# Patient Record
Sex: MALE | Race: WHITE | URBAN - METROPOLITAN AREA
[De-identification: names, ages, dates, MRNs, and addresses within clinical notes are randomized per-mention and may not be internally consistent; named-entity substitution may affect disease eponyms.]

---

## 2018-02-08 ENCOUNTER — OFFICE VISIT (OUTPATIENT)
Dept: SLEEP MEDICINE | Facility: CLINIC | Age: 48
End: 2018-02-08
Payer: COMMERCIAL

## 2018-02-08 ENCOUNTER — TELEPHONE (OUTPATIENT)
Dept: SLEEP MEDICINE | Facility: CLINIC | Age: 48
End: 2018-02-08

## 2018-02-08 VITALS
DIASTOLIC BLOOD PRESSURE: 83 MMHG | OXYGEN SATURATION: 97 % | WEIGHT: 239 LBS | SYSTOLIC BLOOD PRESSURE: 149 MMHG | HEART RATE: 74 BPM | BODY MASS INDEX: 33.46 KG/M2 | HEIGHT: 71 IN

## 2018-02-08 DIAGNOSIS — G47.33 OSA (OBSTRUCTIVE SLEEP APNEA): Primary | ICD-10-CM

## 2018-02-08 PROCEDURE — 99244 OFF/OP CNSLTJ NEW/EST MOD 40: CPT | Performed by: FAMILY MEDICINE

## 2018-02-08 NOTE — TELEPHONE ENCOUNTER
Patient called and wants to get his C-Pap from [email or web address]  And he will bill it himself. He needs a copy of the prescription, Dr. Long stating he does not need to be retested for his apnea. He only needs a replacement machine. Faxed to  [employer]  preauthorization to [phone]

## 2018-02-08 NOTE — MR AVS SNAPSHOT
After Visit Summary   2/8/2018    Hal Dumont    MRN: [id number]           Patient Information     Date Of Birth          1970        Visit Information        Provider Department      2/8/2018 8:30 AM Jas Merlos MD [facility]        Today's Diagnoses     SAAD (obstructive sleep apnea)    -  1      Care Instructions      Your BMI is Body mass index is 33.81 kg/(m^2).  Weight management is a personal decision.  If you are interested in exploring weight loss strategies, the following discussion covers the approaches that may be successful. Body mass index (BMI) is one way to tell whether you are at a healthy weight, overweight, or obese. It measures your weight in relation to your height.  A BMI of 18.5 to 24.9 is in the healthy range. A person with a BMI of 25 to 29.9 is considered overweight, and someone with a BMI of 30 or greater is considered obese. More than two-thirds of American adults are considered overweight or obese.  Being overweight or obese increases the risk for further weight gain. Excess weight may lead to heart disease and diabetes.  Creating and following plans for healthy eating and physical activity may help you improve your health.  Weight control is part of healthy lifestyle and includes exercise, emotional health, and healthy eating habits. Careful eating habits lifelong are the mainstay of weight control. Though there are significant health benefits from weight loss, long-term weight loss with diet alone may be very difficult to achieve- studies show long-term success with dietary management in less than 10% of people. Attaining a healthy weight may be especially difficult to achieve in those with severe obesity. In some cases, medications, devices and surgical management might be considered.  What can you do?  If you are overweight or obese and are interested in methods for weight loss, you should discuss this with your provider.      Consider reducing daily calorie intake by 500 calories.     Keep a food journal.     Avoiding skipping meals, consider cutting portions instead.    Diet combined with exercise helps maintain muscle while optimizing fat loss. Strength training is particularly important for building and maintaining muscle mass. Exercise helps reduce stress, increase energy, and improves fitness. Increasing exercise without diet control, however, may not burn enough calories to loose weight.       Start walking three days a week 10-20 minutes at a time    Work towards walking thirty minutes five days a week     Eventually, increase the speed of your walking for 1-2 minutes at time    In addition, we recommend that you review healthy lifestyles and methods for weight loss available through the National Institutes of Health patient information sites:  http://win.niddk.nih.gov/publications/index.htm    And look into health and wellness programs that may be available through your health insurance provider, employer, local community center, or abiodun club.    Weight management plan: Patient was referred to their PCP to discuss a diet and exercise plan.            Follow-ups after your visit        Follow-up notes from your care team     Return in about 4 weeks (around 3/8/2018), or if symptoms worsen or fail to improve.      Who to contact     If you have questions or need follow up information about today's clinic visit or your schedule please contact [facility] directly at [phone].  Normal or non-critical lab and imaging results will be communicated to you by MyChart, letter or phone within 4 business days after the clinic has received the results. If you do not hear from us within 7 days, please contact the clinic through MyChart or phone. If you have a critical or abnormal lab result, we will notify you by phone as soon as possible.  Submit refill requests through [employer] or call your pharmacy and they will  "forward the refill request to us. Please allow 3 business days for your refill to be completed.          Additional Information About Your Visit        [employer] Information     [employer] gives you secure access to your electronic health record. If you see a primary care provider, you can also send messages to your care team and make appointments. If you have questions, please call your primary care clinic.  If you do not have a primary care provider, please call [phone] and they will assist you.        Care EveryWhere ID     This is your Care EveryWhere ID. This could be used by other organizations to access your [facility] medical records  [id number]        Your Vitals Were     Pulse Height Pulse Oximetry BMI (Body Mass Index)          74 1.791 m (5' 10.5\") 97% 33.81 kg/m2         Blood Pressure from Last 3 Encounters:   02/08/18 149/83   09/25/13 (!) 152/98   07/21/12 129/88    Weight from Last 3 Encounters:   02/08/18 108.4 kg (239 lb)   09/25/13 112.3 kg (247 lb 9.6 oz)   07/21/12 108.4 kg (239 lb)              We Performed the Following     Comprehensive DME        Primary Care Provider Office Phone # Fax #    Jose Lyn Jr., [phone] [address]  [place] [address]        Equal Access to Services     [facility]: Hadii aad ku hadasho Soomaali, waaxda luqadaha, qaybta kaalmada adeegyada, waxay idiin haymarion dragan smith . So [facility] [phone].    ATENCIÓN: Si habla español, tiene a thorne disposición servicios gratuitos de asistencia lingüística. Llame al [phone].    We comply with applicable federal civil rights laws and [place] laws. We do not discriminate on the basis of race, color, national origin, age, disability, sex, sexual orientation, or gender identity.            Thank you!     Thank you for choosing [facility]  for your care. Our goal is always to provide you with excellent care. Hearing back from our patients is one way we can " continue to improve our services. Please take a few minutes to complete the written survey that you may receive in the mail after your visit with us. Thank you!             Your Updated Medication List - Protect others around you: Learn how to safely use, store and throw away your medicines at www.disposemymeds.org.          This list is accurate as of 2/8/18  9:54 AM.  Always use your most recent med list.                   Brand Name Dispense Instructions for use Diagnosis    ADVIL 200 MG tablet   Generic drug:  ibuprofen      Take 200 mg by mouth every 4 hours as needed        aspirin 325 MG EC tablet      1 TABLET DAILY        COQ10 PO           cyclobenzaprine 10 MG tablet    FLEXERIL    30 tablet    Take 1 tablet (10 mg) by mouth 3 times daily as needed for muscle spasms    Neck pain       * order for DME     1 Units    Travel sized auto-titrating CPAP machine with supplies (tubing, mask, etc.)   Use as directed at bedtime.    SAAD (obstructive sleep apnea)       * order for DME     3 Month    Use as directed at bedtime    SAAD (obstructive sleep apnea)       * Notice:  This list has 2 medication(s) that are the same as other medications prescribed for you. Read the directions carefully, and ask your doctor or other care provider to review them with you.

## 2018-02-08 NOTE — NURSING NOTE
"Chief Complaint   Patient presents with     Consult     Re-establish care, desires RX for travel machine, possible HST       Initial /83  Pulse 74  Ht 1.791 m (5' 10.5\")  Wt 108.4 kg (239 lb)  SpO2 97%  BMI 33.81 kg/m2 Estimated body mass index is 33.81 kg/(m^2) as calculated from the following:    Height as of this encounter: 1.791 m (5' 10.5\").    Weight as of this encounter: 108.4 kg (239 lb).  Medication Reconciliation: complete    "

## 2018-02-08 NOTE — Clinical Note
Can we send a copy of the consult note to his primary at [facility], Dr. Mahi Pruitt.

## 2018-02-08 NOTE — PROGRESS NOTES
"  Sleep Consultation:    Date on this visit: 2/8/2018    Hal Dumont is sent by Dr. Mahi Pruitt for a sleep consultation regarding establishing care for SAAD and new supplies / replacement CPAP.    Primary Physician: Jose Lyn Jr.     CC: \"I am interested in getting a new travel CPAP.\"    HPI:  Hal Dumont is a pleasant 47 yo M with history of obesity, HTN, SAAD who presents to establish care for SAAD.  He is alone for this visit today.    Diagnosed with moderate to severe SAAD on PSG at [place] on 9/3/2008 with weight 225 lbs, AHI 21.9, RDI 30.3, jens SpO2 87%, CPAP 5 effective.    He has used CPAP since that time and overall done well, but his wife has mentioned some return of regular snoring even with use of CPAP.  His CPAP does not have download capability, but is currently set on auto-titrate 4-20 cm H2O.    Hal denies any sleep walking and dream enactment.    Patient's [place] Sleepiness score 8/24 consistent with no daytime sleepiness.      FHx of father and two brothers with SAAD.    SHx:  , two children.  Works as  of [employer] at [employer].    Allergies:    No Known Allergies    Medications:    Current Outpatient Prescriptions   Medication Sig Dispense Refill     Coenzyme Q10 (COQ10 PO)        ibuprofen (ADVIL) 200 MG tablet Take 200 mg by mouth every 4 hours as needed       cyclobenzaprine (FLEXERIL) 10 MG tablet Take 1 tablet (10 mg) by mouth 3 times daily as needed for muscle spasms 30 tablet 1     ORDER FOR DME Use as directed at bedtime 3 Month 3     ORDER FOR DME Travel sized auto-titrating CPAP machine with supplies (tubing, mask, etc.)    Use as directed at bedtime. 1 Units 0     ASPIRIN 325 MG OR TBEC 1 TABLET DAILY         Problem List:  Patient Active Problem List    Diagnosis Date Noted     Obesity 12/07/2011     Priority: Medium     CARDIOVASCULAR SCREENING; LDL GOAL LESS THAN 130 11/28/2011     Priority: Medium     SAAD (obstructive sleep " apnea) 11/28/2011     Priority: Medium        Past Medical/Surgical History:  Past Medical History:   Diagnosis Date     Obesity      Sleep apnea      Past Surgical History:   Procedure Laterality Date     CIRCUMCISION,CLAMP  AGE 12       Social History:  Social History     Social History     Marital status: Single     Spouse name: N/A     Number of children: N/A     Years of education: N/A     Occupational History     Not on file.     Social History Main Topics     Smoking status: Former Smoker     Quit date: 10/1/2008     Smokeless tobacco: Never Used     Alcohol use Yes      Comment: daily-weekly     Drug use: No     Sexual activity: Yes     Partners: Female     Other Topics Concern     Not on file     Social History Narrative     No narrative on file       Family History:  Family History   Problem Relation Age of Onset     DIABETES Mother      Hypertension Mother      Hypertension Father      Asthma Father      DIABETES Brother      TYPE II     Hypertension Brother      HEART DISEASE Maternal Grandmother      HEART DISEASE Paternal Grandfather        Review of Systems:  A complete review of systems reviewed by me is negative with the exeption of what has been mentioned in the history of present illness.  CONSTITUTIONAL: NEGATIVE for weight gain/loss, fever, chills, sweats or night sweats, drug allergies.  EYES:  POSITIVE for changes in vision and double vision  ENT:  POSITIVE for  ear pain and sinus pain  CARDIAC:  POSITIVE for  chest pressure and hypertension  NEUROLOGIC: NEGATIVE headaches, weakness or numbness in the arms or legs.  DERMATOLOGIC:  POSITIVE for  rashes  PULMONARY: NEGATIVE SOB at rest, SOB with activity, dry cough, productive cough, coughing up blood, wheezing or whistling when breathing.    GASTROINTESTINAL:  POSITIVE for  loose or watery stools  GENITOURINARY: NEGATIVE for pain during urination, blood in urine, urinating more frequently than usual, irregular menstrual  "periods.  MUSCULOSKELETAL: NEGATIVE for muscle pain, bone or joint pain, swollen joints.  ENDOCRINE: NEGATIVE for increased thirst or urination, diabetes.  LYMPHATIC: NEGATIVE for swollen lymph nodes, lumps or bumps in the breasts or nipple discharge.    Physical Examination:  Vitals: /83  Pulse 74  Ht 1.791 m (5' 10.5\")  Wt 108.4 kg (239 lb)  SpO2 97%  BMI 33.81 kg/m2  BMI= Body mass index is 33.81 kg/(m^2).    Neck Cir (cm): 48 cm    Young Harris Total Score 2/8/2018   Total score - Young Harris 8     GENERAL APPEARANCE: healthy, alert, no distress and over weight  RESP: lungs clear to auscultation - no rales, rhonchi or wheezes  CV: regular rates and rhythm, normal S1 S2, no S3 or S4 and no murmur, click or rub  PSYCH: mentation appears normal and affect normal/bright    Impression/Plan:    Hal Dumont is a pleasant 49 yo M with history of obesity, HTN, SAAD who presents to establish care for SAAD.    1.)  Moderate to severe SAAD without sleep-associated hypoxemia   - PSG at [place] on 9/3/2008 with weight 225 lbs, AHI 21.9, RDI 30.3, jens SpO2 87%, CPAP 5 effective.   - Current CPAP set on auto-titrate 4-20 cm H2O, report of residual snoring, but also noted that tube is taped and suspect leak.   - Reviewed CPAP and travel CPAP machines.  He is most interested in the ResMed Air Mini with nasal pillows P10 mask.  Orders placed for auto 5-15, recommend follow-up in 4-8 weeks to review download, see if residual snoring.   - Given weight relatively stable, no new significant cardiac or pulmonary concerns, did not see strong indication for repeat sleep testing.    Plan as given to patient as above.    I have spent 60 minutes with this patient today in which greater than 50% of this time was spent in the counseling / coordination of care regarding SAAD.    Obstructive sleep apnea reviewed.  Complications of untreated sleep apnea were reviewed.    Jas Merlos MD    CC: Dr. Mahi Pruitt        "

## 2018-02-08 NOTE — PATIENT INSTRUCTIONS

## 2020-02-23 ENCOUNTER — HEALTH MAINTENANCE LETTER (OUTPATIENT)
Age: 50
End: 2020-02-23

## 2020-12-12 ENCOUNTER — HEALTH MAINTENANCE LETTER (OUTPATIENT)
Age: 50
End: 2020-12-12

## 2021-04-11 ENCOUNTER — HEALTH MAINTENANCE LETTER (OUTPATIENT)
Age: 51
End: 2021-04-11

## 2021-09-26 ENCOUNTER — HEALTH MAINTENANCE LETTER (OUTPATIENT)
Age: 51
End: 2021-09-26

## 2022-05-08 ENCOUNTER — HEALTH MAINTENANCE LETTER (OUTPATIENT)
Age: 52
End: 2022-05-08

## 2023-01-08 ENCOUNTER — HEALTH MAINTENANCE LETTER (OUTPATIENT)
Age: 53
End: 2023-01-08

## 2023-06-02 ENCOUNTER — HEALTH MAINTENANCE LETTER (OUTPATIENT)
Age: 53
End: 2023-06-02